# Patient Record
Sex: FEMALE | Race: WHITE | NOT HISPANIC OR LATINO | Employment: FULL TIME | URBAN - METROPOLITAN AREA
[De-identification: names, ages, dates, MRNs, and addresses within clinical notes are randomized per-mention and may not be internally consistent; named-entity substitution may affect disease eponyms.]

---

## 2018-08-10 ENCOUNTER — HOSPITAL ENCOUNTER (OUTPATIENT)
Dept: URGENT CARE | Facility: CLINIC | Age: 38
Discharge: HOME | End: 2018-08-10
Attending: FAMILY MEDICINE | Admitting: FAMILY MEDICINE

## 2018-08-21 ENCOUNTER — HOSPITAL ENCOUNTER (OUTPATIENT)
Dept: MRI IMAGING | Facility: HOSPITAL | Age: 38
Discharge: HOME | End: 2018-08-21
Attending: ORTHOPAEDIC SURGERY | Admitting: ORTHOPAEDIC SURGERY

## 2021-11-01 ENCOUNTER — OFFICE VISIT (OUTPATIENT)
Dept: FAMILY MEDICINE CLINIC | Facility: CLINIC | Age: 41
End: 2021-11-01

## 2021-11-01 VITALS
SYSTOLIC BLOOD PRESSURE: 131 MMHG | RESPIRATION RATE: 16 BRPM | OXYGEN SATURATION: 98 % | HEART RATE: 76 BPM | TEMPERATURE: 96.9 F | HEIGHT: 64 IN | BODY MASS INDEX: 32.78 KG/M2 | DIASTOLIC BLOOD PRESSURE: 86 MMHG | WEIGHT: 192 LBS

## 2021-11-01 DIAGNOSIS — Z83.49 FAMILY HISTORY OF THYROID DISEASE IN MOTHER: ICD-10-CM
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
## 2021-11-01 DIAGNOSIS — Z12.31 ENCOUNTER FOR SCREENING MAMMOGRAM FOR MALIGNANT NEOPLASM OF BREAST: ICD-10-CM

## 2021-11-01 DIAGNOSIS — Z13.220 ENCOUNTER FOR SCREENING FOR LIPID DISORDER: ICD-10-CM

## 2021-11-01 DIAGNOSIS — J45.990 ASTHMA, EXERCISE INDUCED: ICD-10-CM
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
## 2021-11-01 PROBLEM — S80.212A ABRASION, LEFT KNEE, INITIAL ENCOUNTER: Status: ACTIVE | Noted: 2018-08-10

## 2021-11-01 PROBLEM — R59.0 CERVICAL LYMPHADENOPATHY: Status: ACTIVE | Noted: 2019-01-30

## 2021-11-01 PROBLEM — M25.561 KNEE PAIN, RIGHT: Status: ACTIVE | Noted: 2018-08-15

## 2021-11-01 PROBLEM — N20.0 KIDNEY STONE: Status: ACTIVE | Noted: 2021-11-01

## 2021-11-01 PROBLEM — M23.91 INTERNAL DERANGEMENT OF RIGHT KNEE: Status: ACTIVE | Noted: 2018-08-10

## 2021-11-01 PROBLEM — J06.9 ACUTE UPPER RESPIRATORY INFECTION: Status: ACTIVE | Noted: 2017-08-09

## 2021-11-01 PROBLEM — E66.9 CLASS 1 OBESITY: Status: ACTIVE | Noted: 2021-11-01

## 2021-11-01 PROCEDURE — 99202 OFFICE O/P NEW SF 15 MIN: CPT | Performed by: FAMILY MEDICINE

## 2021-11-01 RX ORDER — IBUPROFEN 600 MG/1
600 TABLET ORAL 3 TIMES DAILY PRN
COMMUNITY
Start: 2021-10-27 | End: 2021-11-01

## 2021-11-01 RX ORDER — AMOXICILLIN AND CLAVULANATE POTASSIUM 875; 125 MG/1; MG/1
1 TABLET, FILM COATED ORAL 2 TIMES DAILY WITH MEALS
COMMUNITY
Start: 2021-10-27 | End: 2021-11-17

## 2021-11-01 NOTE — PROGRESS NOTES
Subjective   Trini Ferrara is a 41 y.o. female.     Chief Complaint   Patient presents with   • Establish Care   • Wound Check   • Asthma       No current outpatient medications on file.    Past Medical History:   Diagnosis Date   • Asthma, exercise induced 5/19/2016   • Kidney stone 1998   • MAMMO    • PAP     NEG = 2018   Dr Villela       Past Surgical History:   Procedure Laterality Date   • CYSTOSCOPY BLADDER STONE LITHOTRIPSY         Family History   Problem Relation Age of Onset   • Cancer Paternal Grandfather    • Hypertension Father    • Thyroid disease Mother    • Hypertension Brother    • Asthma Brother        Social History     Socioeconomic History   • Marital status:    Tobacco Use   • Smoking status: Never Smoker   • Smokeless tobacco: Never Used   Vaping Use   • Vaping Use: Never used   Substance and Sexual Activity   • Alcohol use: Not Currently     Alcohol/week: 0.0 standard drinks     Comment: occ   • Drug use: Never   • Sexual activity: Yes     Partners: Male     Birth control/protection: Surgical       40 y/o C female here to est care w/ hx/o Asthma/ Kidney stones and wound check      Pt states she was accidentally bitten by her dog -----------Pt seen in ER and had stitches placed and they aren't due to be taken out until Thursday. Pt has been keeping the area clean and dry       The following portions of the patient's history were reviewed and updated as appropriate: allergies, current medications, past family history, past medical history, past social history, past surgical history and problem list.    Review of Systems    Vitals:    11/01/21 1017   BP: 131/86   Pulse: 76   Resp: 16   Temp: 96.9 °F (36.1 °C)   SpO2: 98%       Objective   Physical Exam  Vitals and nursing note reviewed.   Constitutional:       General: She is not in acute distress.     Appearance: She is well-developed. She is not ill-appearing or toxic-appearing.   HENT:      Head: Normocephalic and atraumatic.    Cardiovascular:      Rate and Rhythm: Normal rate and regular rhythm.      Heart sounds: Normal heart sounds. No murmur heard.      Pulmonary:      Effort: Pulmonary effort is normal. No respiratory distress.      Breath sounds: Normal breath sounds. No stridor. No wheezing or rales.   Skin:     General: Skin is warm and dry.      Findings: Signs of injury and wound present. No rash.      Comments: +3 sutures removed w/o difficulty and steri strips applied ------wounds healing well w/o signs of infection   Neurological:      Mental Status: She is alert and oriented to person, place, and time.      Cranial Nerves: No cranial nerve deficit.   Psychiatric:         Mood and Affect: Mood normal.         Behavior: Behavior normal.         Thought Content: Thought content normal.         Judgment: Judgment normal.           Assessment/Plan   Diagnoses and all orders for this visit:    1. Dog bite, subsequent encounter (Primary)    2. Asthma, exercise induced    3. Encounter for screening mammogram for malignant neoplasm of breast  -     Mammo Screening Digital Tomosynthesis Bilateral With CAD; Future    4. Encounter for screening for lipid disorder  -     Lipid Panel; Future  -     Comprehensive Metabolic Panel; Future    5. Family history of thyroid disease in mother  -     TSH; Future  -     T4, Free; Future    wound care disc'd w/ pt   Fasting labs soon  Rx---Mammo soon

## 2021-11-17 ENCOUNTER — CLINICAL SUPPORT (OUTPATIENT)
Dept: FAMILY MEDICINE CLINIC | Facility: CLINIC | Age: 41
End: 2021-11-17

## 2021-11-17 VITALS — DIASTOLIC BLOOD PRESSURE: 87 MMHG | SYSTOLIC BLOOD PRESSURE: 124 MMHG | HEART RATE: 81 BPM

## 2021-11-17 DIAGNOSIS — Z83.49 FAMILY HISTORY OF THYROID DISEASE IN MOTHER: ICD-10-CM

## 2021-11-17 DIAGNOSIS — Z13.220 ENCOUNTER FOR SCREENING FOR LIPID DISORDER: ICD-10-CM

## 2021-11-17 PROCEDURE — 84443 ASSAY THYROID STIM HORMONE: CPT | Performed by: FAMILY MEDICINE

## 2021-11-17 PROCEDURE — 36415 COLL VENOUS BLD VENIPUNCTURE: CPT | Performed by: FAMILY MEDICINE

## 2021-11-17 PROCEDURE — 80061 LIPID PANEL: CPT | Performed by: FAMILY MEDICINE

## 2021-11-17 PROCEDURE — 84439 ASSAY OF FREE THYROXINE: CPT | Performed by: FAMILY MEDICINE

## 2021-11-17 PROCEDURE — 80053 COMPREHEN METABOLIC PANEL: CPT | Performed by: FAMILY MEDICINE

## 2021-11-18 LAB
ALBUMIN SERPL-MCNC: 4.5 G/DL (ref 3.5–5.2)
ALBUMIN/GLOB SERPL: 1.8 G/DL
ALP SERPL-CCNC: 68 U/L (ref 39–117)
ALT SERPL W P-5'-P-CCNC: 17 U/L (ref 1–33)
ANION GAP SERPL CALCULATED.3IONS-SCNC: 5.6 MMOL/L (ref 5–15)
AST SERPL-CCNC: 19 U/L (ref 1–32)
BILIRUB SERPL-MCNC: 0.4 MG/DL (ref 0–1.2)
BUN SERPL-MCNC: 10 MG/DL (ref 6–20)
BUN/CREAT SERPL: 13.3 (ref 7–25)
CALCIUM SPEC-SCNC: 9.2 MG/DL (ref 8.6–10.5)
CHLORIDE SERPL-SCNC: 106 MMOL/L (ref 98–107)
CHOLEST SERPL-MCNC: 178 MG/DL (ref 0–200)
CO2 SERPL-SCNC: 29.4 MMOL/L (ref 22–29)
CREAT SERPL-MCNC: 0.75 MG/DL (ref 0.57–1)
GFR SERPL CREATININE-BSD FRML MDRD: 85 ML/MIN/1.73
GLOBULIN UR ELPH-MCNC: 2.5 GM/DL
GLUCOSE SERPL-MCNC: 90 MG/DL (ref 65–99)
HDLC SERPL-MCNC: 43 MG/DL (ref 40–60)
LDLC SERPL CALC-MCNC: 123 MG/DL (ref 0–100)
LDLC/HDLC SERPL: 2.84 {RATIO}
POTASSIUM SERPL-SCNC: 4.4 MMOL/L (ref 3.5–5.2)
PROT SERPL-MCNC: 7 G/DL (ref 6–8.5)
SODIUM SERPL-SCNC: 141 MMOL/L (ref 136–145)
T4 FREE SERPL-MCNC: 1.32 NG/DL (ref 0.93–1.7)
TRIGL SERPL-MCNC: 64 MG/DL (ref 0–150)
TSH SERPL DL<=0.05 MIU/L-ACNC: 0.72 UIU/ML (ref 0.27–4.2)
VLDLC SERPL-MCNC: 12 MG/DL (ref 5–40)

## 2022-02-02 ENCOUNTER — E-VISIT (OUTPATIENT)
Dept: FAMILY MEDICINE CLINIC | Facility: TELEHEALTH | Age: 42
End: 2022-02-02

## 2022-02-02 PROCEDURE — BRIGHTMDVISIT: Performed by: NURSE PRACTITIONER

## 2022-02-02 RX ORDER — POLYMYXIN B SULFATE AND TRIMETHOPRIM 1; 10000 MG/ML; [USP'U]/ML
1 SOLUTION OPHTHALMIC EVERY 4 HOURS
Qty: 1 EACH | Refills: 0 | Status: SHIPPED | OUTPATIENT
Start: 2022-02-02 | End: 2022-02-09

## 2022-02-02 RX ORDER — METHYLPREDNISOLONE 4 MG/1
TABLET ORAL
Qty: 21 TABLET | Refills: 0 | Status: SHIPPED | OUTPATIENT
Start: 2022-02-02 | End: 2022-06-14

## 2022-02-02 NOTE — EXTERNAL PATIENT INSTRUCTIONS
Note   I have also prescribed the following. Medrol- This is a steroid that will help with swelling and possible allergic reaction. Antibiotic eye drops- You may take this is you develop crusting, thick eye drainage or if the antihistamine eye drops to not help.   Diagnosis   Allergic conjunctivitis   My name is Allison Raygoza. I'm a healthcare provider at [facility]. I'm sorry you're not feeling well.   Based on your photos and interview responses, I see you have some common signs of allergic conjunctivitis, including:    Eye redness   Medications   Non-prescription      Ketotifen ophthalmic solution (0.025%): Place 1 drop in affected eye twice a day for eye allergy symptoms. Space doses 8 to 12 hours apart. Do not use more than 2 times in 1 day. Use exactly as directed. It may take up to 2 weeks for the medication to take full effect. Continue to use as long as symptoms remain. Common brands include Alaway and Zaditor.    After reviewing your photos and responses, I'm confident the eye drops I've recommended above will help with your current symptoms. As long as you follow the steps in the Other treatment section below, you should feel better within 2 weeks. If your symptoms continue or get worse, schedule an appointment.   About your diagnosis   Conjunctivitis is an inflammation of the clear membrane covering the eyeball and the inner eyelid. Allergic conjunctivitis happens when this membrane reacts to something in the environment. These are the key things to know about allergic conjunctivitis:    Unlike other forms of conjunctivitis (also known as pink eye), allergic conjunctivitis is not contagious.    This condition usually affects both eyes at the same time.    The eye discharge tends to be thin or stringy.    Common triggers include grass, pollen, dust mites, and animal dander.    This condition tends to happen more often to people like you who have other allergic conditions, such as seasonal or pet  allergies, asthma, hay fever, or eczema.   What to expect   Fortunately, allergic conjunctivitis usually clears up once the allergen or irritant is removed and/or after treatment with allergy medications. If you follow the steps in the Other treatment section below, you should feel better within 2 weeks.   When to seek care   Call us at [phone]   with any sudden or unexpected symptoms.    Difficulty opening your eye.    Moderate to severe eye pain, at rest or with eye movements.    Increase in swelling of the eye.    Changes in vision that doesn't get better when you wipe your eye.    Sensitivity to bright lights, such as needing to shade your eyes by wearing a hat or sunglasses, holding up your hand to block out light, or keeping your head down and turned away from lights, lamps, or windows.    Redness in or around your eye that increases or gets worse. In rare cases, eye drops can make eye irritation worse.    A fever that's above 100.4F or that lasts for more than 4 days.    You're not getting better after 2 weeks, or your symptoms are the same or get worse.   Other treatment    Use refrigerated artificial tears and cool compresses to help with eye dryness, itching, and inflammation.    Don't rub your eye. This can make your symptoms worse.   Prevention    Try to avoid allergy triggers. Keep doors and windows closed on days when the pollen count is high. Regularly wash bedding, dust furniture, and vacuum floors to remove allergens such as dust mites. If your allergies flare up often or are hard to manage, you may want to schedule an in-person appointment to learn about other treatment options.    If you start wearing contact lenses, don't wear them for too long each day, and make sure they're cleaned properly. Carefully follow the instructions for use and cleaning.   Your provider   Your diagnosis was provided by Allison Raygoza, a member of your trusted care team at [facility]   If you have any  questions, call us at [phone]  .

## 2022-02-02 NOTE — E-VISIT TREATED
Chief Complaint: Eye pain, eye bump, or irritation   Patient introduction   Patient is 41-year-old female who reports eye redness and eyelid swelling in the right eye for < 1 day.   Warning. The following may warrant further investigation:    Taking antibiotics to treat conjunctivitis within the past month   Patient-submitted comments   Patient writes: It feels like I have touched something and then may have touched my eye. Started out as my eye was just itching and then started to swell, I tried taking Benadryl for it last night but did not seem to help. It yu a little but not a whole lot and not consistently..   Patient did not request an excuse note.   General presentation   Eye redness described as small blood vessels spread throughout the white part of the eye.   Reports redness, swelling, or warmth of skin around the eyes.   No itchiness, burning/sloan/gritty feeling, flaking or dry eyelid skin, eyelid pain, or eyeball pain.   Patient denies the following symptoms: - Fever    Changes in vision    Foreign body sensation    Light sensitivity    Headache    URI symptoms    Flaking or dry skin on eyelids   Patient does not wear contact lenses.   No known conjunctivitis exposure in the last 2 weeks. History of conjunctivitis in the past. Current symptoms feel nothing like previous episodes of conjunctivitis. Use of antibiotics to treat conjunctivitis within the last month.   Denies symptoms started shortly after a change in environment. No known history of seasonal and/or pet allergies. No history of developing similar eye symptoms at the same time of year. History of asthma.   Denies using eye drops or eye wash for current symptoms.   Patient denies the following red flags:    Serious vision changes    Feeling as if something is in the eye or sensitivity to bright lights    Recent eye injury and affected eye is bulging out    Signs/symptoms suggesting angle-closure glaucoma (cloudiness or dullness of the  iris along with moderate to severe headache)    History of glaucoma in the past 3 months    Eye surgery in the past 3 months    Severe headache    Open sores or blisters on eyelids, nose, scalp, forehead, or around their eyes    White or grayish sore on the eye(s)    Moderate to severe eyelid swelling    Getting irritants in their eyes within the past week    Treatment for any eye conditions in past 4 weeks    Eye redness that looks like small blood vessels, with the redness worse around the iris   Pregnancy/menstrual status/breastfeeding:   Denies being pregnant. Denies breastfeeding. Regarding last menstrual period, patient writes: 1.28.22.   Self-exam: - No pain with eye movements - No bulging of the affected eye   Current medications   Denies taking other medications or supplements.   Medication allergies   No known drug allergies.   Medication contraindication review   None.   Past medical history   No immunocompromising conditions. Patient denies history of cancer.   Assessment   Allergic conjunctivitis   This is the likely diagnosis based on supporting interview responses, including:    Other atopic conditions, such as asthma   Plan   Medications:    ketotifen 0.025% ophthalmic solution OTC 0.025% 1 gtt in affected eye bid 30d for eye allergy symptoms. Space doses 8 to 12 hours apart. Do not use more than 2 times in 1 day. Use exactly as directed. It may take up to 2 weeks for the medication to take full effect. Continue to use as long as symptoms remain. Common brands include Alaway and Zaditor. Amount is 5 mL.   No prescriptions were ordered to treat this patient. The patient selected the following pharmacy during their interview, but no prescriptions were sent:   [id number]   [address] [place] [id number]   Phone: [phone]     Fax: [phone]   Patient informed to purchase OTC medication.   Education:    Condition and causes    Prevention    Treatment and self-care     When to call provider   Additional Notes   Right eye swelling and conjunctival erythema.      ----------   Electronically signed by MIRNA Dave on 2022-02-02 at 09:43AM   ----------   Patient Interview Transcript:   Which of these symptoms are bothering you? Select all that apply.    Redness in the whites of the eye(s)    Eyelid swelling   Not selected:    Eye drainage (such as excess tears, mucus, or pus)    Crusting of the eyelids or eyelashes    Bump or pimple on or inside the eyelid    Eye dryness   Do you have any of these symptoms? Select all that apply.    None of the above   Not selected:    Itchiness of the eye(s)    Burning, sloan, or gritty feeling in the eye(s)    Flaking or dry skin on the eyelid    Eyelid pain    Pain inside the eyeball   How long have you had your eye symptoms? Select one.    Less than 1 day   Not selected:    1 to 3 days    4 to 7 days    More than 7 days   Which eye is affected? Select one.    My right eye   Not selected:    My left eye    Both eyes    It started in one eye, but now both eyes are affected   Does it feel like there's something in your eye that's making it hard to open your eye or keep it open? Select one.    No   Not selected:    Yes   Is the skin around your eye red, swollen, or warm to the touch? Pay special attention to the area above your eyelid, the upper cheek, forehead, and the top part of the nose (between the eyes). Select one.    Yes   Not selected:    No    I'm not sure   You mentioned that your eyelid is swollen. How would you describe it? Select one.    A little puffy, but I can easily open my eye   Not selected:    Swollen, and it's hard to open my eye    My eye is completely swollen shut   How would you describe your eye redness? Select one.    A. It looks like small blood vessels spread throughout the white part of the eye   Not selected:    B. It looks like small blood vessels, but the redness is much worse around the colored part of the  eye    C. There's a spot of red in just one part of the white area    D. None of the above   Is there pain or increased pain when you move your eye(s)? To test this, focus on an object in the distance with both eyes. Now look to the left, the right, above, and below that object without moving your head. Select one.    No   Not selected:    Yes   Compare your affected eye to your other eye. Does the affected eye seem to be bulging out more than the other eye? Select one.    No   Not selected:    Yes   Does your eye look cloudy, causing the colored part of the eye to appear dull?    No   Not selected:    Yes   Do you have any open sores or blisters around your eyes or on your eyelids, nose, scalp, or forehead? Select one.    No   Not selected:    Yes   Do you have a white or grayish sore on your eye?    No   Not selected:    Yes   Eye conditions may be more serious when certain factors are present. Have you had any vision changes? Select one.    No   Not selected:    Yes, my vision is a little blurry, but it clears when I wipe my eyes    Yes, I'm seeing double    Yes, I can't see anything at all   Are you sensitive to bright lights? For example, do you need to shade your eyes by wearing a hat or sunglasses? Do you find yourself holding up your hand to block out light? Do you keep your head down and turned away from lights, lamps, or windows? Select one.    No   Not selected:    Yes   We need to see your eye. Photos help us make a diagnosis and recommend the right treatment. If you choose not to send photos, you may need to speak with a provider to get care. Select one.    OK, I'll upload photos from my computer   Not selected:    No, I'd rather not upload photos   Send up to three photos for review. Remember: - Don't use a flash. - Take one close-up photo showing both eyes together. - Take a second close-up photo of the affected eye only. For this photo, pull down the lower lid and look upward.    Upload 1    Upload  2    Upload 3   Do your symptoms include a headache? Select one.    No   Not selected:    Yes, a mild headache    Yes, a moderate headache; it gets in the way of my daily activities    Yes, a severe headache; it stops me from doing my daily activities    Yes, the worst headache of my life   Along with your eye sypmtoms, have you had a fever or felt hot? Select one.    No   Not selected:    Yes   Do you currently have any of these symptoms? Select all that apply.    None of the above   Not selected:    Cough    Stuffy nose    Runny nose    Sore throat   In addition to your eye symptoms, have you developed any of these? Select all that apply.    None of the above   Not selected:    Muscle aches    Skin rash   Have you recently injured your eye(s)? Injuries include being scratched, hit, or poked in the eye. Select one.    No   Not selected:    Yes   In the past week, have you gotten any irritants in your eye(s)? Irritants include things such as chemicals and cleaning supplies. Select one.    No   Not selected:    Yes   Have you been around someone with pink eye (conjunctivitis) in the last 2 weeks? Select one.    No, not that I know of   Not selected:    Yes   Do you have any seasonal or pet allergies? Select one.    No, not that I know of   Not selected:    Yes   Do you often develop similar symptoms during the same time or season of the year? Select one.    No   Not selected:    Yes   Did your symptoms begin shortly after a change in your environment? This might include moving to a new home, being exposed to a new pet, or traveling to a new place. Select one.    No   Not selected:    Yes   Have you ever had pink eye in the past? Select one.    Yes   Not selected:    No   How much do your current symptoms feel like past cases of pink eye? Select one.    Completely different   Not selected:    Exactly the same    Mostly the same    Somewhat the same   In the past 3 months, have you been diagnosed with glaucoma? Select  one.    No   Not selected:    Yes   Have you had any surgery on your eye(s) in the past 3 months? Select one.    No   Not selected:    Yes   Have you been seen or treated for any eye conditions in the past 4 weeks? Select one.    No   Not selected:    Yes   Do you wear contact lenses? Select one.    No   Not selected:    Yes   Do you have a history of any of these? Select all that apply.    Asthma   Not selected:    Hay fever (allergic rhinitis)    Eczema    None of the above   Have you recently been treated for any of these? Select all that apply.    None of the above   Not selected:    Aspirin- or NSAID-induced asthma or urticaria (hives)    Aspirin triad, Samter's triad, or aspirin-exacerbated respiratory disease (AERD)    Coronary artery bypass graft (CABG) surgery    Fungal eye infection    Scratched cornea, or corneal abrasion    Tuberculosis infection of the eye    Viral eye infection   Do you have any of these conditions that can affect the immune system? Scroll to see all options. Select all that apply.    None of these   Not selected:    History of bone marrow transplant    Chronic kidney disease    Chronic liver disease (including cirrhosis)    HIV/AIDS    Inflammatory bowel disease (Crohn's disease or ulcerative colitis)    Lupus    Moderate to severe plaque psoriasis    Multiple sclerosis    Rheumatoid arthritis    Sickle cell anemia    Alpha or beta thalassemia    History of solid organ transplant (kidney, liver, or heart)    History of spleen removal    An autoimmune disorder not listed here    A condition requiring treatment with long-term use of oral steroids (such as prednisone, prednisolone, or dexamethasone)   Have you ever been diagnosed with cancer? Select one.    No   Not selected:    Yes, I have cancer now    Yes, but I'm in remission   Are you pregnant? Select one.    No   Not selected:    Yes   When was your last menstrual period? If you don't currently have periods or no longer have  periods, please briefly explain.    1.28.22   Are you breastfeeding? Select one.    No   Not selected:    Yes   Have you used antibiotic eye drops for pink eye in the past month? Select one.    Yes   Not selected:    No   Have you used any of these non-prescription eye drops or an eye wash for your current symptoms? Select all that apply.    No   Not selected:    Eye drops for itchy eyes (such as Naphcon A, Opcon-A, or Visine-A)    Eye drops for red eyes (such as Advanced Eye Relief Redness, Clear Eyes Redness Relief)    Eye drops for dry eyes (such as Artificial Tears, Advanced Eye Relief Dry Eyes, Refresh Tears, or Systane Ultra)    Eye drops for allergies (such as Zaditor)    Eye wash    Other (specify)   Are you taking any other medications or supplements? On the next screen, you need to list all vitamins, supplements, non-prescription medications (such as aspirin or Aleve), and prescription medications that you're taking. Select one.    No   Not selected:    Yes    Yes, but I'm not sure what they are   Have you ever had an allergic or bad reaction to any medication? Select one.    No   Not selected:    Yes   Do you need a doctor's note? A doctor's note confirms that you received care today and states when you can return to school or work. It does not contain information about your diagnosis or treatment plan. Your provider will make the final decision on whether to give you a doctor's note. Doctor's notes CANNOT be backdated. Select one.    No   Not selected:    Today only (1 day)    Today and tomorrow (2 days)    3 days   Is there anything else you'd like to tell us about your symptoms?    It feels like I have touched something and then may have touched my eye. Started out as my eye was just itching and then started to swell, I tried taking Benadryl for it last night but did not seem to help. It yu a little but not a whole lot and not consistently.   ----------   Medical history   The following information  was received from the EMR on February 02, 2022.   Allergies:    No Known Allergies   Problem list:    Abrasion, left knee, initial encounter   - Category: Problem List Item   - Health Status:   - Start Date: November 01, 2021   - End Date: None   - Status: active    Acute pharyngitis   - Category: Problem List Item   - Health Status:   - Start Date: November 01, 2021   - End Date: None   - Status: inactive    Acute tonsillitis   - Category: Problem List Item   - Health Status:   - Start Date: November 01, 2021   - End Date: None   - Status: inactive    Asthma, exercise induced   - Category: Problem List Item   - Health Status:   - Start Date: November 01, 2021   - End Date: None   - Status: active    Acute upper respiratory infection   - Category: Problem List Item   - Health Status:   - Start Date: November 01, 2021   - End Date: None   - Status: inactive    Cervical lymphadenopathy   - Category: Problem List Item   - Health Status:   - Start Date: November 01, 2021   - End Date: None   - Status: active    Class 1 obesity   - Category: Problem List Item   - Health Status:   - Start Date: November 01, 2021   - End Date: None   - Status: active    Fall   - Category: Problem List Item   - Health Status:   - Start Date: November 01, 2021   - End Date: None   - Status: active    Internal derangement of right knee   - Category: Problem List Item   - Health Status:   - Start Date: November 01, 2021   - End Date: None   - Status: active    Knee pain, right   - Category: Problem List Item   - Health Status:   - Start Date: November 01, 2021   - End Date: None   - Status: active    Kidney stone   - Category: Problem List Item   - Health Status:   - Start Date: November 01, 2021   - End Date: None   - Status: active

## 2022-06-14 ENCOUNTER — OFFICE VISIT (OUTPATIENT)
Dept: FAMILY MEDICINE CLINIC | Facility: CLINIC | Age: 42
End: 2022-06-14

## 2022-06-14 VITALS
SYSTOLIC BLOOD PRESSURE: 136 MMHG | BODY MASS INDEX: 32.78 KG/M2 | HEIGHT: 64 IN | WEIGHT: 192 LBS | TEMPERATURE: 98.4 F | HEART RATE: 79 BPM | RESPIRATION RATE: 14 BRPM | DIASTOLIC BLOOD PRESSURE: 86 MMHG | OXYGEN SATURATION: 96 %

## 2022-06-14 DIAGNOSIS — S96.911A RIGHT ANKLE STRAIN, INITIAL ENCOUNTER: Primary | ICD-10-CM

## 2022-06-14 PROCEDURE — 99213 OFFICE O/P EST LOW 20 MIN: CPT | Performed by: FAMILY MEDICINE

## 2023-03-13 PROCEDURE — 82365 CALCULUS SPECTROSCOPY: CPT | Performed by: UROLOGY

## 2023-03-14 ENCOUNTER — LAB REQUISITION (OUTPATIENT)
Dept: LAB | Facility: HOSPITAL | Age: 43
End: 2023-03-14
Payer: COMMERCIAL

## 2023-03-14 DIAGNOSIS — N20.0 CALCULUS OF KIDNEY: ICD-10-CM

## 2023-03-18 LAB
CALCIUM OXALATE DIHYDRATE MFR STONE IR: 30 %
COLOR STONE: NORMAL
COM MFR STONE: 65 %
COMPN STONE: NORMAL
HYDROXYAPATITE: 5 %
LABORATORY COMMENT REPORT: NORMAL
LABORATORY COMMENT REPORT: NORMAL
Lab: NORMAL
Lab: NORMAL
PHOTO: NORMAL
SIZE STONE: NORMAL MM
SPEC SOURCE SUBJ: NORMAL
STONE ANALYSIS-IMP: NORMAL
WT STONE: 64 MG

## 2023-08-28 ENCOUNTER — OFFICE VISIT (OUTPATIENT)
Dept: FAMILY MEDICINE CLINIC | Facility: CLINIC | Age: 43
End: 2023-08-28
Payer: COMMERCIAL

## 2023-08-28 VITALS
BODY MASS INDEX: 33.12 KG/M2 | SYSTOLIC BLOOD PRESSURE: 129 MMHG | RESPIRATION RATE: 16 BRPM | HEIGHT: 64 IN | OXYGEN SATURATION: 98 % | WEIGHT: 194 LBS | TEMPERATURE: 97.7 F | HEART RATE: 72 BPM | DIASTOLIC BLOOD PRESSURE: 88 MMHG

## 2023-08-28 DIAGNOSIS — L98.9 BACK SKIN LESION: ICD-10-CM

## 2023-08-28 DIAGNOSIS — Z12.31 ENCOUNTER FOR SCREENING MAMMOGRAM FOR MALIGNANT NEOPLASM OF BREAST: ICD-10-CM

## 2023-08-28 DIAGNOSIS — Z13.220 ENCOUNTER FOR SCREENING FOR LIPID DISORDER: ICD-10-CM

## 2023-08-28 DIAGNOSIS — Z00.00 ANNUAL PHYSICAL EXAM: Primary | ICD-10-CM

## 2023-08-28 DIAGNOSIS — K42.9 UMBILICAL HERNIA WITHOUT OBSTRUCTION AND WITHOUT GANGRENE: ICD-10-CM

## 2023-08-28 DIAGNOSIS — Z83.49 FAMILY HISTORY OF THYROID DISEASE IN MOTHER: ICD-10-CM

## 2023-08-28 DIAGNOSIS — D22.5 MELANOCYTIC NEVUS OF TRUNK: ICD-10-CM

## 2023-08-28 PROBLEM — S80.212A ABRASION, LEFT KNEE, INITIAL ENCOUNTER: Status: RESOLVED | Noted: 2018-08-10 | Resolved: 2023-08-28

## 2023-08-28 PROBLEM — E66.9 OBESITY (BMI 30-39.9): Status: ACTIVE | Noted: 2023-08-28

## 2023-08-28 PROBLEM — R59.0 CERVICAL LYMPHADENOPATHY: Status: RESOLVED | Noted: 2019-01-30 | Resolved: 2023-08-28

## 2023-08-28 LAB
BILIRUB BLD-MCNC: NEGATIVE MG/DL
CLARITY, POC: CLEAR
COLOR UR: YELLOW
EXPIRATION DATE: ABNORMAL
GLUCOSE UR STRIP-MCNC: NEGATIVE MG/DL
KETONES UR QL: NEGATIVE
LEUKOCYTE EST, POC: NEGATIVE
Lab: ABNORMAL
NITRITE UR-MCNC: NEGATIVE MG/ML
PH UR: 5.5 [PH] (ref 5–8)
PROT UR STRIP-MCNC: NEGATIVE MG/DL
RBC # UR STRIP: ABNORMAL /UL
SP GR UR: 1.03 (ref 1–1.03)
UROBILINOGEN UR QL: NORMAL

## 2023-08-28 PROCEDURE — 99396 PREV VISIT EST AGE 40-64: CPT | Performed by: FAMILY MEDICINE

## 2023-08-28 PROCEDURE — 81003 URINALYSIS AUTO W/O SCOPE: CPT | Performed by: FAMILY MEDICINE

## 2023-08-28 NOTE — PROGRESS NOTES
Subjective   Trini Ferrara is a 42 y.o. female.     Chief Complaint   Patient presents with    Annual Exam     Physical/No papsmear       No current outpatient medications on file.    Past Medical History:   Diagnosis Date    Asthma, exercise induced 05/19/2016    Kidney stone 1998    MAMMO     NEG = 2021    PAP     NEG = 2018/ 2022       Past Surgical History:   Procedure Laterality Date    CYSTOSCOPY BLADDER STONE LITHOTRIPSY         Family History   Problem Relation Age of Onset    Cancer Paternal Grandfather     Hypertension Father     Thyroid disease Mother     Hypertension Brother     Asthma Brother        Social History     Socioeconomic History    Marital status:    Tobacco Use    Smoking status: Never     Passive exposure: Yes    Smokeless tobacco: Never   Vaping Use    Vaping Use: Never used   Substance and Sexual Activity    Alcohol use: Not Currently     Comment: occ    Drug use: Never    Sexual activity: Yes     Partners: Male     Birth control/protection: Surgical       History of Present Illness  41 y/o C female here for annual PE w/o pap     She has mild elevation of her blood pressure today. She denies monitoring her blood pressure at home. She denies taking any medications    She completed screening mammogram in 12/08/2021. Her most recent PAP smear was in 2022. She does not follow up with any specialist.     She follows-up with her dentist regularly.    She has to visit an ophthalmologist for reduced vision. She is not using glasses and denies any headaches.      She does not follow-up with a dermatologist. She reports being sunburned easily.     She has a abdominal hernia that can sometimes cause pain. She tries to follow a healthy diet and exercise. She has a history of renal calculi and has underwent lithotripsy multiple times. She denies any abdominal bloating, abnormal bowel movements, hematochezia, and abnormal mucus or black stools.    She denies chest pain, dyspnea and wheezing.      The following portions of the patient's history were reviewed and updated as appropriate: allergies, current medications, past family history, past medical history, past social history, past surgical history and problem list.    Review of Systems   Constitutional:  Negative for activity change, appetite change, fatigue, unexpected weight gain and unexpected weight loss.   HENT:  Negative for congestion, ear pain, hearing loss, nosebleeds, postnasal drip, rhinorrhea, sinus pressure, sneezing, sore throat, tinnitus and trouble swallowing.    Eyes:  Positive for visual disturbance. Negative for blurred vision and double vision.   Respiratory:  Negative for cough, shortness of breath and wheezing.    Cardiovascular:  Negative for chest pain.   Gastrointestinal:  Positive for abdominal pain. Negative for abdominal distention, blood in stool, constipation, diarrhea, nausea, vomiting, GERD and indigestion.        Abdominal hernia.   Genitourinary:  Negative for difficulty urinating, dysuria, flank pain, frequency, urgency and urinary incontinence.   Musculoskeletal:  Negative for arthralgias and myalgias.   Skin:  Negative for rash and skin lesions.   Neurological:  Negative for weakness, headache, memory problem and confusion.   Psychiatric/Behavioral:  Negative for agitation, self-injury, suicidal ideas, depressed mood and stress. The patient is not nervous/anxious.      Vitals:    08/28/23 1546   BP: 129/88   Pulse: 72   Resp: 16   Temp: 97.7 °F (36.5 °C)   SpO2: 98%       Objective   Physical Exam  Vitals and nursing note reviewed.   Constitutional:       General: She is not in acute distress.     Appearance: Normal appearance. She is well-developed. She is not ill-appearing, toxic-appearing or diaphoretic.   HENT:      Head: Normocephalic and atraumatic.      Right Ear: Tympanic membrane, ear canal and external ear normal. There is no impacted cerumen.      Left Ear: Tympanic membrane, ear canal and external ear  normal. There is no impacted cerumen.      Nose: Nose normal. No congestion or rhinorrhea.      Mouth/Throat:      Mouth: Mucous membranes are moist.      Pharynx: No oropharyngeal exudate or posterior oropharyngeal erythema.   Eyes:      Extraocular Movements: Extraocular movements intact.      Conjunctiva/sclera: Conjunctivae normal.      Pupils: Pupils are equal, round, and reactive to light.   Cardiovascular:      Rate and Rhythm: Normal rate and regular rhythm.      Heart sounds: Normal heart sounds. No murmur heard.  Pulmonary:      Effort: Pulmonary effort is normal. No respiratory distress.      Breath sounds: Normal breath sounds. No wheezing.          Comments: +Irreg multipigmented sessile <1cm skin lesion at midline mid thoracic area  +Left mid thoracic area w/ 1/2 cm sessile light brown pigmented soft skin lesion w/ central hyperpigmentation  Abdominal:      General: Bowel sounds are normal. There is no distension.      Palpations: Abdomen is soft. There is no mass.      Tenderness: There is no abdominal tenderness. There is no guarding or rebound.      Hernia: A hernia is present. Hernia is present in the umbilical area.   Musculoskeletal:         General: Normal range of motion.      Cervical back: Normal range of motion and neck supple.      Right lower leg: No edema.      Left lower leg: No edema.   Lymphadenopathy:      Cervical: No cervical adenopathy.   Skin:     General: Skin is warm and dry.      Findings: No rash.   Neurological:      General: No focal deficit present.      Mental Status: She is alert and oriented to person, place, and time.      Cranial Nerves: No cranial nerve deficit.      Gait: Gait normal.   Psychiatric:         Mood and Affect: Mood normal.         Behavior: Behavior normal.         Thought Content: Thought content normal.         Judgment: Judgment normal.       BMI is >= 30 and <35. (Class 1 Obesity). The following options were offered after discussion;: exercise  counseling/recommendations and nutrition counseling/recommendations      Assessment & Plan   Diagnoses and all orders for this visit:    1. Annual physical exam (Primary)  -     POC Urinalysis Dipstick, Automated  -     Lipid Panel; Future  -     Comprehensive Metabolic Panel; Future    2. Back skin lesion  -     Ambulatory Referral to Dermatology    3. Melanocytic nevus of trunk  -     Ambulatory Referral to Dermatology    4. Umbilical hernia without obstruction and without gangrene    5. Encounter for screening for lipid disorder  -     Lipid Panel; Future    6. Encounter for screening mammogram for malignant neoplasm of breast  -     Mammo Screening Digital Tomosynthesis Bilateral With CAD; Future    7. Family history of thyroid disease in mother  -     TSH; Future  -     T4, Free; Future        1. Melanocytic nevus.  - Referral to dermatology for consultation on varied color mole on the trunk.    2.Cherry hemangioma.    3.Renal calculi.    4.Umb hernia.  - The patient is advised to go to ER if the hernia becomes non-reducible and has persistent pain.    5.Health maintenance.  - Screening mammogram due after 12/08/2023.  - PAP smear due in 2025.  - Recommended to undergo Influenza and COVID-19 vaccines.  - Lipid panel, CMP, TSH and free T4 to be completed in near future.  Pt to cont to work on healthy diet changes     Transcribed from ambient dictation for Corina Ashraf DO by Bella Gonzalez.  08/28/23   17:39 EDT    Patient or patient representative verbalized consent to the visit recording.  I have personally performed the services described in this document as transcribed by the above individual, and it is both accurate and complete.

## 2023-09-18 ENCOUNTER — CLINICAL SUPPORT (OUTPATIENT)
Dept: FAMILY MEDICINE CLINIC | Facility: CLINIC | Age: 43
End: 2023-09-18
Payer: COMMERCIAL

## 2023-09-18 DIAGNOSIS — Z13.220 ENCOUNTER FOR SCREENING FOR LIPID DISORDER: ICD-10-CM

## 2023-09-18 DIAGNOSIS — Z83.49 FAMILY HISTORY OF THYROID DISEASE IN MOTHER: ICD-10-CM

## 2023-09-18 DIAGNOSIS — Z00.00 ANNUAL PHYSICAL EXAM: ICD-10-CM

## 2023-09-18 PROCEDURE — 80061 LIPID PANEL: CPT | Performed by: FAMILY MEDICINE

## 2023-09-18 PROCEDURE — 80053 COMPREHEN METABOLIC PANEL: CPT | Performed by: FAMILY MEDICINE

## 2023-09-18 PROCEDURE — 36415 COLL VENOUS BLD VENIPUNCTURE: CPT | Performed by: FAMILY MEDICINE

## 2023-09-18 PROCEDURE — 84439 ASSAY OF FREE THYROXINE: CPT | Performed by: FAMILY MEDICINE

## 2023-09-18 PROCEDURE — 84443 ASSAY THYROID STIM HORMONE: CPT | Performed by: FAMILY MEDICINE

## 2023-09-18 NOTE — PROGRESS NOTES
Venipuncture performed in L ARM by RT Mario  with good hemostasis. Patient tolerated well. 09/18/23 Gretchen Connolly, RT

## 2023-09-19 ENCOUNTER — HOSPITAL ENCOUNTER (OUTPATIENT)
Dept: MAMMOGRAPHY | Facility: HOSPITAL | Age: 43
Discharge: HOME | End: 2023-09-19
Admitting: FAMILY MEDICINE
Payer: COMMERCIAL

## 2023-09-19 DIAGNOSIS — Z12.31 ENCOUNTER FOR SCREENING MAMMOGRAM FOR MALIGNANT NEOPLASM OF BREAST: ICD-10-CM

## 2023-09-19 LAB
ALBUMIN SERPL-MCNC: 4.5 G/DL (ref 3.5–5.2)
ALBUMIN/GLOB SERPL: 1.7 G/DL
ALP SERPL-CCNC: 85 U/L (ref 39–117)
ALT SERPL W P-5'-P-CCNC: 16 U/L (ref 1–33)
ANION GAP SERPL CALCULATED.3IONS-SCNC: 12 MMOL/L (ref 5–15)
AST SERPL-CCNC: 23 U/L (ref 1–32)
BILIRUB SERPL-MCNC: 0.4 MG/DL (ref 0–1.2)
BUN SERPL-MCNC: 9 MG/DL (ref 6–20)
BUN/CREAT SERPL: 11.7 (ref 7–25)
CALCIUM SPEC-SCNC: 9.2 MG/DL (ref 8.6–10.5)
CHLORIDE SERPL-SCNC: 105 MMOL/L (ref 98–107)
CHOLEST SERPL-MCNC: 171 MG/DL (ref 0–200)
CO2 SERPL-SCNC: 25 MMOL/L (ref 22–29)
CREAT SERPL-MCNC: 0.77 MG/DL (ref 0.57–1)
EGFRCR SERPLBLD CKD-EPI 2021: 98.9 ML/MIN/1.73
GLOBULIN UR ELPH-MCNC: 2.6 GM/DL
GLUCOSE SERPL-MCNC: 95 MG/DL (ref 65–99)
HDLC SERPL-MCNC: 38 MG/DL (ref 40–60)
LDLC SERPL CALC-MCNC: 120 MG/DL (ref 0–100)
LDLC/HDLC SERPL: 3.14 {RATIO}
POTASSIUM SERPL-SCNC: 4.1 MMOL/L (ref 3.5–5.2)
PROT SERPL-MCNC: 7.1 G/DL (ref 6–8.5)
SODIUM SERPL-SCNC: 142 MMOL/L (ref 136–145)
T4 FREE SERPL-MCNC: 1.29 NG/DL (ref 0.93–1.7)
TRIGL SERPL-MCNC: 68 MG/DL (ref 0–150)
TSH SERPL DL<=0.05 MIU/L-ACNC: 0.78 UIU/ML (ref 0.27–4.2)
VLDLC SERPL-MCNC: 13 MG/DL (ref 5–40)

## 2023-09-19 PROCEDURE — 77063 BREAST TOMOSYNTHESIS BI: CPT

## 2023-09-19 PROCEDURE — 77067 SCR MAMMO BI INCL CAD: CPT

## 2023-09-20 DIAGNOSIS — R92.8 ABNORMAL MAMMOGRAM OF LEFT BREAST: Primary | ICD-10-CM

## 2023-10-09 ENCOUNTER — HOSPITAL ENCOUNTER (OUTPATIENT)
Dept: ULTRASOUND IMAGING | Facility: HOSPITAL | Age: 43
Discharge: HOME | End: 2023-10-09
Payer: COMMERCIAL

## 2023-10-09 ENCOUNTER — HOSPITAL ENCOUNTER (OUTPATIENT)
Dept: MAMMOGRAPHY | Facility: HOSPITAL | Age: 43
Discharge: HOME | End: 2023-10-09
Payer: COMMERCIAL

## 2023-10-09 DIAGNOSIS — R92.8 ABNORMAL MAMMOGRAM OF LEFT BREAST: ICD-10-CM

## 2023-10-09 PROCEDURE — G0279 TOMOSYNTHESIS, MAMMO: HCPCS

## 2023-10-09 PROCEDURE — 77065 DX MAMMO INCL CAD UNI: CPT

## 2024-03-15 ENCOUNTER — OFFICE VISIT (OUTPATIENT)
Dept: FAMILY MEDICINE CLINIC | Facility: CLINIC | Age: 44
End: 2024-03-15
Payer: COMMERCIAL

## 2024-03-15 VITALS
WEIGHT: 199 LBS | DIASTOLIC BLOOD PRESSURE: 97 MMHG | RESPIRATION RATE: 14 BRPM | HEIGHT: 64 IN | HEART RATE: 86 BPM | SYSTOLIC BLOOD PRESSURE: 150 MMHG | TEMPERATURE: 98.4 F | OXYGEN SATURATION: 99 % | BODY MASS INDEX: 33.97 KG/M2

## 2024-03-15 DIAGNOSIS — R31.9 HEMATURIA, UNSPECIFIED TYPE: Primary | ICD-10-CM

## 2024-03-15 DIAGNOSIS — R30.0 DYSURIA: ICD-10-CM

## 2024-03-15 LAB
BILIRUB BLD-MCNC: NEGATIVE MG/DL
CLARITY, POC: CLEAR
COLOR UR: YELLOW
EXPIRATION DATE: ABNORMAL
GLUCOSE UR STRIP-MCNC: NEGATIVE MG/DL
HOLD SPECIMEN: NORMAL
KETONES UR QL: NEGATIVE
LEUKOCYTE EST, POC: ABNORMAL
Lab: ABNORMAL
NITRITE UR-MCNC: NEGATIVE MG/ML
PH UR: 7 [PH] (ref 5–8)
PROT UR STRIP-MCNC: NEGATIVE MG/DL
RBC # UR STRIP: ABNORMAL /UL
SP GR UR: 1.02 (ref 1–1.03)
UROBILINOGEN UR QL: NORMAL

## 2024-03-15 PROCEDURE — 87077 CULTURE AEROBIC IDENTIFY: CPT | Performed by: FAMILY MEDICINE

## 2024-03-15 PROCEDURE — 87086 URINE CULTURE/COLONY COUNT: CPT | Performed by: FAMILY MEDICINE

## 2024-03-15 PROCEDURE — 81003 URINALYSIS AUTO W/O SCOPE: CPT | Performed by: FAMILY MEDICINE

## 2024-03-15 PROCEDURE — 99213 OFFICE O/P EST LOW 20 MIN: CPT | Performed by: FAMILY MEDICINE

## 2024-03-15 PROCEDURE — 87186 SC STD MICRODIL/AGAR DIL: CPT | Performed by: FAMILY MEDICINE

## 2024-03-15 PROCEDURE — 81001 URINALYSIS AUTO W/SCOPE: CPT | Performed by: FAMILY MEDICINE

## 2024-03-15 NOTE — PROGRESS NOTES
Subjective   Trini Ferrara is a 43 y.o. female.     Chief Complaint   Patient presents with    Hypertension     Patient states that her dentist checked her BP a couple weeks ago and it was around 150/100.     Urinary Tract Infection     Patient states that she has pain after she urinates. Patient is on her menstrual cycle today and she does have a urologist.        No current outpatient medications on file.    Past Medical History:   Diagnosis Date    Asthma, exercise induced 05/19/2016    Kidney stone 1998    MAMMO     NEG = 2021/ 2023    PAP     NEG = 2018/ 2022       Past Surgical History:   Procedure Laterality Date    CYSTOSCOPY BLADDER STONE LITHOTRIPSY         Family History   Problem Relation Age of Onset    Cancer Paternal Grandfather     Hypertension Father     Thyroid disease Mother     Hypertension Brother     Asthma Brother        Social History     Socioeconomic History    Marital status:    Tobacco Use    Smoking status: Never     Passive exposure: Yes    Smokeless tobacco: Never   Vaping Use    Vaping status: Never Used   Substance and Sexual Activity    Alcohol use: Not Currently     Comment: occ    Drug use: Never    Sexual activity: Yes     Partners: Male     Birth control/protection: Surgical       History of Present Illness     The patient is a 43-year-old female who is here for follow-up on elevated blood pressure and recurrent UTIs.    She is not on any antibiotics currently for her UTI. She had a UTI in 1/2024 and was on 3 different medications from urgent care.  Her symptoms come and go. She will have symptoms for 1 or 2 days and then it will go away. Usually, drinking water calms her symptoms down. She denies any back pain. She gets kidney stones twice a year. She sees Dr. Alvarez, urologist. She used to see him once a year and sometimes every 6 months. She is scheduled to see him again in 05/2024 or 06/2024. She has dysuria.    The patient's blood pressure is elevated today. She  has a blood pressure cuff at home, but she has not checked it at home. She assumes her blood pressure cuff is accurate.    She is a non-smoker. She does not drink alcohol or use drugs.    Her father has hypertension.    The patient has no known drug allergies.    She has not received any new influenza or COVID-19 vaccines.She has a history of asthma and kidney stones. She sees Dr. Alvarez. She gets Pap smears every 3 years. She gets mammograms every year. She has had at least 1 scope for the bladder for stones. She denies any new medical problems. She has seen [facility] for a skin survey.    The following portions of the patient's history were reviewed and updated as appropriate: allergies, current medications, past family history, past medical history, past social history, past surgical history and problem list.    Review of Systems   Constitutional:  Negative for activity change, appetite change, fatigue, unexpected weight gain and unexpected weight loss.   Respiratory:  Negative for cough, chest tightness, shortness of breath and wheezing.    Cardiovascular:  Negative for chest pain, palpitations and leg swelling.   Genitourinary:  Positive for dysuria. Negative for difficulty urinating, frequency, hematuria, urgency and urinary incontinence.   Musculoskeletal:  Negative for arthralgias and myalgias.   Neurological:  Negative for dizziness, facial asymmetry, speech difficulty, weakness, light-headedness, headache, memory problem and confusion.       Vitals:    03/15/24 0813   BP: 150/97   Pulse: 86   Resp: 14   Temp: 98.4 °F (36.9 °C)   SpO2: 99%       Objective   Physical Exam  Vitals and nursing note reviewed.   Constitutional:       General: She is not in acute distress.     Appearance: She is not ill-appearing or toxic-appearing.   HENT:      Head: Normocephalic and atraumatic.   Pulmonary:      Effort: Pulmonary effort is normal.   Neurological:      Mental Status: She is alert.        Assessment & Plan   Diagnoses and all orders for this visit:    1. Hematuria, unspecified type (Primary)  -     POC Urinalysis Dipstick, Automated    2. Dysuria  -     Urinalysis With Culture If Indicated - Urine, Clean Catch; Standing  -     Urinalysis With Culture If Indicated - Urine, Clean Catch  -     [facility] Urine Culture Tube - Urine, Clean Catch  -     Urinalysis, Microscopic Only - Urine, Clean Catch  -     Urine Culture - Urine, Urine, Clean Catch    1. Elevated blood pressure.  - Her blood pressure is elevated today. I will check her blood pressure with home cuff. If her blood pressure continues to be elevated, we will start her on a medication.    2. Dysuria  - Her symptoms could be due to bladder irritation. She will take Pyridium and drink a lot of water. I will obtain a urine with reflex culture if indicated.    Follow-up  She is not due for blood work until the end of the year.          Transcribed from ambient dictation for Corina Ashraf DO by Rajani Joseph.  03/15/24   09:03 EDT    Patient or patient representative verbalized consent to the visit recording.  I have personally performed the services described in this document as transcribed by the above individual, and it is both accurate and complete.

## 2024-03-16 LAB
BACTERIA UR QL AUTO: ABNORMAL /HPF
BILIRUB UR QL STRIP: NEGATIVE
CLARITY UR: CLEAR
COLOR UR: ABNORMAL
GLUCOSE UR STRIP-MCNC: NEGATIVE MG/DL
HGB UR QL STRIP.AUTO: ABNORMAL
HYALINE CASTS UR QL AUTO: ABNORMAL /LPF
KETONES UR QL STRIP: NEGATIVE
LEUKOCYTE ESTERASE UR QL STRIP.AUTO: ABNORMAL
NITRITE UR QL STRIP: NEGATIVE
PH UR STRIP.AUTO: 7.5 [PH] (ref 5–8)
PROT UR QL STRIP: ABNORMAL
RBC # UR STRIP: ABNORMAL /HPF
REF LAB TEST METHOD: ABNORMAL
SP GR UR STRIP: 1.01 (ref 1–1.03)
SQUAMOUS #/AREA URNS HPF: ABNORMAL /HPF
UROBILINOGEN UR QL STRIP: ABNORMAL
WBC # UR STRIP: ABNORMAL /HPF

## 2024-03-18 LAB — BACTERIA SPEC AEROBE CULT: ABNORMAL

## 2024-03-21 ENCOUNTER — CLINICAL SUPPORT (OUTPATIENT)
Dept: FAMILY MEDICINE CLINIC | Facility: CLINIC | Age: 44
End: 2024-03-21
Payer: COMMERCIAL

## 2024-03-21 NOTE — PROGRESS NOTES
RIGHT ARM   154/108  HR 80      LEFT ARM   136/100  HR 80      PT ARM CUFF - LEFT ARM   153/109  HR 79

## 2024-03-22 ENCOUNTER — TELEPHONE (OUTPATIENT)
Dept: FAMILY MEDICINE CLINIC | Facility: CLINIC | Age: 44
End: 2024-03-22
Payer: COMMERCIAL

## 2024-03-22 NOTE — TELEPHONE ENCOUNTER
----- Message from Corina Ashraf DO sent at 3/21/2024  1:51 PM EDT -----  Pt's cuff looks fine to use----call w/ home readings in 2 weeks  ----- Message -----  From: Bree Mccullough MA  Sent: 3/21/2024   9:42 AM EDT  To: Corina Ashraf DO